# Patient Record
Sex: FEMALE | Race: WHITE | NOT HISPANIC OR LATINO | Employment: STUDENT | URBAN - METROPOLITAN AREA
[De-identification: names, ages, dates, MRNs, and addresses within clinical notes are randomized per-mention and may not be internally consistent; named-entity substitution may affect disease eponyms.]

---

## 2023-08-04 PROBLEM — F41.0 PANIC DISORDER: Status: ACTIVE | Noted: 2023-08-04

## 2023-08-04 PROBLEM — N94.6 DYSMENORRHEA IN ADOLESCENT: Status: ACTIVE | Noted: 2023-08-04

## 2023-08-04 RX ORDER — ESCITALOPRAM OXALATE 10 MG/1
1 TABLET ORAL DAILY
COMMUNITY
Start: 2022-10-31 | End: 2023-09-05 | Stop reason: SDUPTHER

## 2023-08-04 RX ORDER — NORETHINDRONE ACETATE AND ETHINYL ESTRADIOL 1MG-20(21)
KIT ORAL
COMMUNITY
Start: 2022-11-01 | End: 2023-09-05 | Stop reason: SDUPTHER

## 2023-08-04 RX ORDER — L.ACID,FERM,PLA,RHA/B.BIF,LONG 126 MG
TABLET, DELAYED AND EXTENDED RELEASE ORAL
COMMUNITY
Start: 2022-10-31

## 2023-08-04 RX ORDER — HYDROXYZINE HYDROCHLORIDE 10 MG/1
1 TABLET, FILM COATED ORAL 3 TIMES DAILY PRN
COMMUNITY
Start: 2022-10-31 | End: 2023-09-05 | Stop reason: SDUPTHER

## 2023-09-05 ENCOUNTER — OFFICE VISIT (OUTPATIENT)
Dept: PRIMARY CARE | Facility: CLINIC | Age: 18
End: 2023-09-05
Payer: OTHER GOVERNMENT

## 2023-09-05 VITALS
SYSTOLIC BLOOD PRESSURE: 114 MMHG | BODY MASS INDEX: 25.06 KG/M2 | HEART RATE: 68 BPM | OXYGEN SATURATION: 99 % | WEIGHT: 146 LBS | TEMPERATURE: 96.8 F | RESPIRATION RATE: 16 BRPM | DIASTOLIC BLOOD PRESSURE: 64 MMHG

## 2023-09-05 DIAGNOSIS — Z01.419 WELL WOMAN EXAM WITH ROUTINE GYNECOLOGICAL EXAM: ICD-10-CM

## 2023-09-05 DIAGNOSIS — F41.9 ANXIETY: ICD-10-CM

## 2023-09-05 DIAGNOSIS — F41.0 PANIC DISORDER: Primary | ICD-10-CM

## 2023-09-05 DIAGNOSIS — N94.6 DYSMENORRHEA IN ADOLESCENT: ICD-10-CM

## 2023-09-05 DIAGNOSIS — R21 RASH OF FACE: ICD-10-CM

## 2023-09-05 PROCEDURE — 1036F TOBACCO NON-USER: CPT

## 2023-09-05 PROCEDURE — 99213 OFFICE O/P EST LOW 20 MIN: CPT

## 2023-09-05 RX ORDER — ESCITALOPRAM OXALATE 10 MG/1
10 TABLET ORAL DAILY
Qty: 90 TABLET | Refills: 3 | Status: SHIPPED | OUTPATIENT
Start: 2023-09-05 | End: 2024-02-16 | Stop reason: ALTCHOICE

## 2023-09-05 RX ORDER — NORETHINDRONE ACETATE AND ETHINYL ESTRADIOL 1MG-20(21)
1 KIT ORAL DAILY
Qty: 28 TABLET | Refills: 3 | Status: SHIPPED | OUTPATIENT
Start: 2023-09-05 | End: 2023-10-12 | Stop reason: SDUPTHER

## 2023-09-05 RX ORDER — METRONIDAZOLE 10 MG/G
1 GEL TOPICAL DAILY
Qty: 60 G | Refills: 11 | Status: SHIPPED | OUTPATIENT
Start: 2023-09-05 | End: 2024-02-16 | Stop reason: ALTCHOICE

## 2023-09-05 RX ORDER — HYDROXYZINE HYDROCHLORIDE 10 MG/1
10 TABLET, FILM COATED ORAL 3 TIMES DAILY PRN
Qty: 90 TABLET | Refills: 3 | Status: SHIPPED | OUTPATIENT
Start: 2023-09-05

## 2023-09-05 RX ORDER — METRONIDAZOLE 10 MG/G
1 GEL TOPICAL DAILY
COMMUNITY
End: 2023-09-05 | Stop reason: SDUPTHER

## 2023-09-05 SDOH — ECONOMIC STABILITY: FOOD INSECURITY: WITHIN THE PAST 12 MONTHS, THE FOOD YOU BOUGHT JUST DIDN'T LAST AND YOU DIDN'T HAVE MONEY TO GET MORE.: NEVER TRUE

## 2023-09-05 SDOH — ECONOMIC STABILITY: FOOD INSECURITY: WITHIN THE PAST 12 MONTHS, YOU WORRIED THAT YOUR FOOD WOULD RUN OUT BEFORE YOU GOT MONEY TO BUY MORE.: NEVER TRUE

## 2023-09-05 ASSESSMENT — PATIENT HEALTH QUESTIONNAIRE - PHQ9
1. LITTLE INTEREST OR PLEASURE IN DOING THINGS: NOT AT ALL
2. FEELING DOWN, DEPRESSED OR HOPELESS: NOT AT ALL
SUM OF ALL RESPONSES TO PHQ9 QUESTIONS 1 & 2: 0

## 2023-09-05 ASSESSMENT — ENCOUNTER SYMPTOMS
EYES NEGATIVE: 1
PSYCHIATRIC NEGATIVE: 1
CONSTITUTIONAL NEGATIVE: 1
MUSCULOSKELETAL NEGATIVE: 1
ALLERGIC/IMMUNOLOGIC NEGATIVE: 1
GASTROINTESTINAL NEGATIVE: 1
NEUROLOGICAL NEGATIVE: 1
RESPIRATORY NEGATIVE: 1
ENDOCRINE NEGATIVE: 1
HEMATOLOGIC/LYMPHATIC NEGATIVE: 1
CARDIOVASCULAR NEGATIVE: 1

## 2023-09-05 ASSESSMENT — LIFESTYLE VARIABLES
HOW OFTEN DO YOU HAVE SIX OR MORE DRINKS ON ONE OCCASION: NEVER
SKIP TO QUESTIONS 9-10: 1
HOW MANY STANDARD DRINKS CONTAINING ALCOHOL DO YOU HAVE ON A TYPICAL DAY: PATIENT DOES NOT DRINK
AUDIT-C TOTAL SCORE: 0
HOW OFTEN DO YOU HAVE A DRINK CONTAINING ALCOHOL: NEVER

## 2023-09-05 ASSESSMENT — PAIN SCALES - GENERAL: PAINLEVEL: 0-NO PAIN

## 2023-09-05 NOTE — LETTER
September 5, 2023     Patient: Lindsay Rogers   YOB: 2005   Date of Visit: 9/5/2023       To Whom It May Concern:    Lindsay Rogers was seen in my clinic on 9/5/2023 at 3:00 pm. Please excuse Lindsay for her absence from school on this day to make the appointment.    If you have any questions or concerns, please don't hesitate to call.         Sincerely,         Edel Washington, APRN-CNS        CC: No Recipients

## 2023-09-05 NOTE — ASSESSMENT & PLAN NOTE
Pt reports a rash around her nose and chin, treated with metronidazole gel with success. Rash disappeared within 2 weeks with metronidazole gel.    Continue current therapy.

## 2023-09-05 NOTE — ASSESSMENT & PLAN NOTE
Pt stating that she is tolerating current therapy and that anxiety/panic attacks are controlled. Panic attacks are only occurring every couple months, uses hydroxyzine PRN to treat with success.     Continue hydroxyzine and escitalopram.

## 2023-09-05 NOTE — PATIENT INSTRUCTIONS
Thank you for coming to see me today.  If you have any questions or concerns following our visit, please contact the office.  Phone: [phone]    Follow up with me in 1 year.     1)  I am referring you to gynecology - please call [phone] to schedule an appointment or stop to 's office (in the lab) on your way out today.    2) Obtain labwork, [address] down the [address].

## 2023-09-05 NOTE — PROGRESS NOTES
Subjective   Patient ID: Lindsay Rogers is a 18 y.o. female who presents for medication refills and to establish with provider.    Pt has no complaints.     Diet: overall healthy balanced diet, eating out x2 per month. Occasional granola bar snacks. Coffee cold brew x4 times per week. Energy drink x3 times per week.   Exercise: running and lifting 1.5 hour x4 times per week.   Weight: stable   Water: 6-7 glasses per day.   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 Apartment. Parents in [place], were in [place] last year for [employer], contributing to her panic attacks.   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     Review of Systems   Constitutional: Negative.    HENT: Negative.     Eyes: Negative.    Respiratory: Negative.     Cardiovascular: Negative.    Gastrointestinal: Negative.    Endocrine: Negative.    Genitourinary: Negative.    Musculoskeletal: Negative.    Allergic/Immunologic: Negative.    Neurological: Negative.    Hematological: Negative.    Psychiatric/Behavioral: Negative.          Current Outpatient Medications   Medication Sig Dispense Refill   • L.acid,ferm,jose,rha-B.bif,long (Controlled Delivery Probiotic) 126 mg (2 billion cell) tablet,delayed and ext.release Take by mouth.     • escitalopram (Lexapro) 10 mg tablet Take 1 tablet (10 mg) by mouth once daily. 90 tablet 3   • hydrOXYzine HCL (Atarax) 10 mg tablet Take 1 tablet (10 mg) by mouth 3 times a day as needed for anxiety. 90 tablet 3   • metroNIDAZOLE (Metrogel) 1 % gel Apply 1 Application topically once daily. 60 g 11   • norethindrone-e.estradioL-iron (Blisovi Fe 1/20, 28,) 1 mg-20 mcg (21)/75 mg (7) tablet Take 1 tablet by mouth once daily. 28 tablet 3     No current facility-administered medications for this visit.     Past Surgical History:   Procedure Laterality Date   • OTHER SURGICAL HISTORY  01/28/2022    Hip labral tear  repair     No family history on file.   Social History     Tobacco Use   • Smoking status: Never   • Smokeless tobacco: Never   Vaping Use   • Vaping Use: Never used   Substance Use Topics   • Alcohol use: Never   • Drug use: Never        Objective     Visit Vitals  /64 (BP Location: Right arm, Patient Position: Sitting, BP Cuff Size: Adult)   Pulse 68   Temp 36 °C (96.8 °F) (Temporal)   Resp 16   Wt 66.2 kg (146 lb)   SpO2 99%   BMI 25.06 kg/m²   Smoking Status Never   BSA 1.73 m²        Physical Exam  Constitutional:       Appearance: Normal appearance. She is normal weight.   HENT:      Head: Normocephalic and atraumatic.      Mouth/Throat:      Mouth: Mucous membranes are moist.      Pharynx: Oropharynx is clear.   Eyes:      Extraocular Movements: Extraocular movements intact.   Cardiovascular:      Rate and Rhythm: Normal rate and regular rhythm.      Pulses: Normal pulses.      Heart sounds: Normal heart sounds.   Pulmonary:      Effort: Pulmonary effort is normal.      Breath sounds: Normal breath sounds.   Abdominal:      General: Abdomen is flat. Bowel sounds are normal.      Palpations: Abdomen is soft.   Musculoskeletal:      Cervical back: Neck supple.   Skin:     General: Skin is warm and dry.      Capillary Refill: Capillary refill takes less than 2 seconds.   Neurological:      General: No focal deficit present.      Mental Status: She is alert.   Psychiatric:         Mood and Affect: Mood normal.         Behavior: Behavior normal.         Assessment/Plan   Problem List Items Addressed This Visit       Dysmenorrhea in adolescent    Relevant Medications    norethindrone-e.estradioL-iron (Blisovi Fe 1/20, 28,) 1 mg-20 mcg (21)/75 mg (7) tablet    Panic disorder - Primary     Pt stating that she is tolerating current therapy and that anxiety/panic attacks are controlled. Panic attacks are only occurring every couple months, uses hydroxyzine PRN to treat with success.     Continue hydroxyzine and  escitalopram.          Rash of face     Pt reports a rash around her nose and chin, treated with metronidazole gel with success. Rash disappeared within 2 weeks with metronidazole gel.    Continue current therapy.          Relevant Medications    metroNIDAZOLE (Metrogel) 1 % gel     Other Visit Diagnoses       Anxiety        Relevant Medications    escitalopram (Lexapro) 10 mg tablet    hydrOXYzine HCL (Atarax) 10 mg tablet    Other Relevant Orders    Comprehensive metabolic panel    CBC    Well woman exam with routine gynecological exam        Relevant Orders    Referral to Gynecology            All pertinent lab work and results were reviewed with patient.     Follow up with me in 1 year or sooner as needed    Edel Washington, APRN-CNS

## 2023-09-06 ENCOUNTER — LAB (OUTPATIENT)
Dept: LAB | Facility: LAB | Age: 18
End: 2023-09-06
Payer: OTHER GOVERNMENT

## 2023-09-06 DIAGNOSIS — F41.9 ANXIETY: ICD-10-CM

## 2023-09-06 LAB
ALANINE AMINOTRANSFERASE (SGPT) (U/L) IN SER/PLAS: 20 U/L (ref 7–45)
ALBUMIN (G/DL) IN SER/PLAS: 4.4 G/DL (ref 3.4–5)
ALKALINE PHOSPHATASE (U/L) IN SER/PLAS: 44 U/L (ref 33–110)
ANION GAP IN SER/PLAS: 9 MMOL/L (ref 10–20)
ASPARTATE AMINOTRANSFERASE (SGOT) (U/L) IN SER/PLAS: 25 U/L (ref 9–39)
BILIRUBIN TOTAL (MG/DL) IN SER/PLAS: 0.5 MG/DL (ref 0–1.2)
CALCIUM (MG/DL) IN SER/PLAS: 9.4 MG/DL (ref 8.6–10.3)
CARBON DIOXIDE, TOTAL (MMOL/L) IN SER/PLAS: 27 MMOL/L (ref 21–32)
CHLORIDE (MMOL/L) IN SER/PLAS: 103 MMOL/L (ref 98–107)
CREATININE (MG/DL) IN SER/PLAS: 0.98 MG/DL (ref 0.5–1.05)
ERYTHROCYTE DISTRIBUTION WIDTH (RATIO) BY AUTOMATED COUNT: 12.4 % (ref 11.5–14.5)
ERYTHROCYTE MEAN CORPUSCULAR HEMOGLOBIN CONCENTRATION (G/DL) BY AUTOMATED: 33 G/DL (ref 32–36)
ERYTHROCYTE MEAN CORPUSCULAR VOLUME (FL) BY AUTOMATED COUNT: 85 FL (ref 80–100)
ERYTHROCYTES (10*6/UL) IN BLOOD BY AUTOMATED COUNT: 5.05 X10E12/L (ref 4–5.2)
GFR FEMALE: 85 ML/MIN/1.73M2
GLUCOSE (MG/DL) IN SER/PLAS: 81 MG/DL (ref 74–99)
HEMATOCRIT (%) IN BLOOD BY AUTOMATED COUNT: 42.7 % (ref 36–46)
HEMOGLOBIN (G/DL) IN BLOOD: 14.1 G/DL (ref 12–16)
LEUKOCYTES (10*3/UL) IN BLOOD BY AUTOMATED COUNT: 5.1 X10E9/L (ref 4.4–11.3)
PLATELETS (10*3/UL) IN BLOOD AUTOMATED COUNT: 311 X10E9/L (ref 150–450)
POTASSIUM (MMOL/L) IN SER/PLAS: 3.9 MMOL/L (ref 3.5–5.3)
PROTEIN TOTAL: 6.7 G/DL (ref 6.4–8.2)
SODIUM (MMOL/L) IN SER/PLAS: 135 MMOL/L (ref 136–145)
UREA NITROGEN (MG/DL) IN SER/PLAS: 12 MG/DL (ref 6–23)

## 2023-09-06 PROCEDURE — 85027 COMPLETE CBC AUTOMATED: CPT

## 2023-09-06 PROCEDURE — 80053 COMPREHEN METABOLIC PANEL: CPT

## 2023-09-06 PROCEDURE — 36415 COLL VENOUS BLD VENIPUNCTURE: CPT

## 2023-09-07 ENCOUNTER — TELEPHONE (OUTPATIENT)
Dept: PRIMARY CARE | Facility: CLINIC | Age: 18
End: 2023-09-07
Payer: OTHER GOVERNMENT

## 2023-09-07 NOTE — TELEPHONE ENCOUNTER
Labs look good, no concerns. Anion gap may be affected by slightly low sodium. Anion gap is a calculated value that becomes more important in patients in intensive care setting. Sodium slightly low is not a problem or concern.

## 2023-10-12 ENCOUNTER — OFFICE VISIT (OUTPATIENT)
Dept: OBSTETRICS AND GYNECOLOGY | Facility: CLINIC | Age: 18
End: 2023-10-12
Payer: OTHER GOVERNMENT

## 2023-10-12 VITALS
HEIGHT: 63 IN | BODY MASS INDEX: 25.87 KG/M2 | SYSTOLIC BLOOD PRESSURE: 100 MMHG | DIASTOLIC BLOOD PRESSURE: 72 MMHG | WEIGHT: 146 LBS

## 2023-10-12 DIAGNOSIS — Z01.419 WOMEN'S ANNUAL ROUTINE GYNECOLOGICAL EXAMINATION: Primary | ICD-10-CM

## 2023-10-12 DIAGNOSIS — Z30.41 ENCOUNTER FOR SURVEILLANCE OF CONTRACEPTIVE PILLS: ICD-10-CM

## 2023-10-12 DIAGNOSIS — N94.6 DYSMENORRHEA IN ADOLESCENT: ICD-10-CM

## 2023-10-12 PROCEDURE — 1036F TOBACCO NON-USER: CPT | Performed by: NURSE PRACTITIONER

## 2023-10-12 PROCEDURE — 99385 PREV VISIT NEW AGE 18-39: CPT | Performed by: NURSE PRACTITIONER

## 2023-10-12 RX ORDER — NORETHINDRONE ACETATE AND ETHINYL ESTRADIOL 1MG-20(21)
1 KIT ORAL DAILY
Qty: 28 TABLET | Refills: 12 | Status: SHIPPED | OUTPATIENT
Start: 2023-10-12 | End: 2024-10-11

## 2023-10-12 ASSESSMENT — ENCOUNTER SYMPTOMS
PSYCHIATRIC NEGATIVE: 1
EYES NEGATIVE: 1
CONSTITUTIONAL NEGATIVE: 1
CARDIOVASCULAR NEGATIVE: 1
GASTROINTESTINAL NEGATIVE: 1
ENDOCRINE NEGATIVE: 1
NEUROLOGICAL NEGATIVE: 1
MUSCULOSKELETAL NEGATIVE: 1
RESPIRATORY NEGATIVE: 1

## 2023-10-12 NOTE — PROGRESS NOTES
Subjective   Patient ID: Lindsay Rogers is a 18 y.o. female who presents for New Patient Visit (NPV, states she was referred by her PCP for PAP and discuss her birth control pills. /).  18 year old here for annual exam without complaints.  She is doing well with her ocp and desires to continue.  She started using her birth controlabout 5 years ago to help with dysmenorrhea.  This has helped her a lot.  Her mom has a history of endometriosis.  She denies any pain or heavy bleeding with her ocp.  She denies any breakthrough bleeding and desires to continue.  She is not due for a pap due to age.  She denies any discharge, need for std testing and urinary changes.         Review of Systems   Constitutional: Negative.    HENT: Negative.     Eyes: Negative.    Respiratory: Negative.     Cardiovascular: Negative.    Gastrointestinal: Negative.    Endocrine: Negative.    Genitourinary: Negative.    Musculoskeletal: Negative.    Skin: Negative.    Neurological: Negative.    Psychiatric/Behavioral: Negative.         Objective   Physical Exam  Vitals reviewed.   Constitutional:       Appearance: Normal appearance. She is well-developed.   Pulmonary:      Effort: Pulmonary effort is normal. No respiratory distress.   Chest:   Breasts:     Breasts are symmetrical.      Right: Normal. No swelling, bleeding, inverted nipple, mass, nipple discharge, skin change or tenderness.      Left: Normal. No swelling, bleeding, inverted nipple, mass, nipple discharge, skin change or tenderness.   Abdominal:      Palpations: Abdomen is soft.   Genitourinary:     General: Normal vulva.      Exam position: Lithotomy position.      Pubic Area: No rash.       Labia:         Right: No rash, tenderness, lesion or injury.         Left: No rash, tenderness, lesion or injury.       Urethra: No prolapse, urethral pain, urethral swelling or urethral lesion.      Vagina: Normal.      Cervix: Normal.      Uterus: Normal.       Adnexa: Right adnexa normal  and left adnexa normal.      Rectum: Normal.   Musculoskeletal:         General: Normal range of motion.   Lymphadenopathy:      Upper Body:      Right upper body: No supraclavicular, axillary or pectoral adenopathy.      Left upper body: No supraclavicular, axillary or pectoral adenopathy.   Skin:     General: Skin is warm and dry.   Neurological:      General: No focal deficit present.      Mental Status: She is alert and oriented to person, place, and time. Mental status is at baseline.   Psychiatric:         Attention and Perception: Attention and perception normal.         Mood and Affect: Mood and affect normal.         Speech: Speech normal.         Behavior: Behavior normal. Behavior is cooperative.         Thought Content: Thought content normal.         Judgment: Judgment normal.         Assessment/Plan   Problem List Items Addressed This Visit             ICD-10-CM    Dysmenorrhea in adolescent N94.6    Relevant Medications    norethindrone-e.estradioL-iron (Blisovi Fe 1/20, 28,) 1 mg-20 mcg (21)/75 mg (7) tablet     Other Visit Diagnoses         Codes    Women's annual routine gynecological examination    -  Primary Z01.419    Encounter for surveillance of contraceptive pills     Z30.41        ASSESSMENT:   Pap due age 19  Declined std testing  Oral contraceptives were discussed.  The risks and benefits were presented to the patient including the risk for VTE's and an increased risk with smoking. Patient stated understanding and desires use of OCP.  Additional use of condoms encouraged to patient to prevent STI's.  Blisovi fe ordered with refills x 1 year  Follow up 1 year or as needed

## 2023-11-13 ENCOUNTER — OFFICE VISIT (OUTPATIENT)
Dept: PRIMARY CARE | Facility: CLINIC | Age: 18
End: 2023-11-13
Payer: OTHER GOVERNMENT

## 2023-11-13 VITALS
RESPIRATION RATE: 16 BRPM | HEART RATE: 68 BPM | TEMPERATURE: 96.6 F | BODY MASS INDEX: 25.69 KG/M2 | SYSTOLIC BLOOD PRESSURE: 114 MMHG | OXYGEN SATURATION: 99 % | DIASTOLIC BLOOD PRESSURE: 68 MMHG | WEIGHT: 145 LBS

## 2023-11-13 DIAGNOSIS — F41.8 ANXIETY WITH DEPRESSION: Primary | ICD-10-CM

## 2023-11-13 PROBLEM — Q82.5 CONGENITAL PIGMENTED MELANOCYTIC NEVUS: Status: ACTIVE | Noted: 2023-11-13

## 2023-11-13 PROBLEM — R53.83 OTHER FATIGUE: Status: ACTIVE | Noted: 2023-11-13

## 2023-11-13 PROBLEM — D22.9 CONGENITAL PIGMENTED MELANOCYTIC NEVUS: Status: ACTIVE | Noted: 2023-11-13

## 2023-11-13 PROBLEM — M25.561 PAIN IN RIGHT KNEE: Status: ACTIVE | Noted: 2018-06-25

## 2023-11-13 PROBLEM — H52.13 MYOPIA, BILATERAL: Status: ACTIVE | Noted: 2023-11-13

## 2023-11-13 PROBLEM — H18.512 ENDOTHELIAL CORNEAL DYSTROPHY, LEFT EYE: Status: ACTIVE | Noted: 2023-11-13

## 2023-11-13 PROBLEM — M22.2X2 PATELLOFEMORAL PAIN SYNDROME OF LEFT KNEE: Status: ACTIVE | Noted: 2023-11-13

## 2023-11-13 PROCEDURE — 99213 OFFICE O/P EST LOW 20 MIN: CPT

## 2023-11-13 PROCEDURE — 1036F TOBACCO NON-USER: CPT

## 2023-11-13 RX ORDER — VENLAFAXINE HYDROCHLORIDE 75 MG/1
75 CAPSULE, EXTENDED RELEASE ORAL DAILY
Qty: 90 CAPSULE | Refills: 3 | Status: SHIPPED | OUTPATIENT
Start: 2023-12-04 | End: 2024-01-15 | Stop reason: ALTCHOICE

## 2023-11-13 RX ORDER — VENLAFAXINE HYDROCHLORIDE 37.5 MG/1
CAPSULE, EXTENDED RELEASE ORAL
Qty: 46 CAPSULE | Refills: 0 | Status: SHIPPED | OUTPATIENT
Start: 2023-11-13 | End: 2024-01-15 | Stop reason: SDUPTHER

## 2023-11-13 SDOH — ECONOMIC STABILITY: FOOD INSECURITY: WITHIN THE PAST 12 MONTHS, YOU WORRIED THAT YOUR FOOD WOULD RUN OUT BEFORE YOU GOT MONEY TO BUY MORE.: NEVER TRUE

## 2023-11-13 SDOH — ECONOMIC STABILITY: FOOD INSECURITY: WITHIN THE PAST 12 MONTHS, THE FOOD YOU BOUGHT JUST DIDN'T LAST AND YOU DIDN'T HAVE MONEY TO GET MORE.: NEVER TRUE

## 2023-11-13 ASSESSMENT — ENCOUNTER SYMPTOMS
RESPIRATORY NEGATIVE: 1
MUSCULOSKELETAL NEGATIVE: 1
EYES NEGATIVE: 1
CARDIOVASCULAR NEGATIVE: 1
NEUROLOGICAL NEGATIVE: 1
ENDOCRINE NEGATIVE: 1
PSYCHIATRIC NEGATIVE: 1
GASTROINTESTINAL NEGATIVE: 1
HEMATOLOGIC/LYMPHATIC NEGATIVE: 1
CONSTITUTIONAL NEGATIVE: 1

## 2023-11-13 ASSESSMENT — ANXIETY QUESTIONNAIRES
IF YOU CHECKED OFF ANY PROBLEMS ON THIS QUESTIONNAIRE, HOW DIFFICULT HAVE THESE PROBLEMS MADE IT FOR YOU TO DO YOUR WORK, TAKE CARE OF THINGS AT HOME, OR GET ALONG WITH OTHER PEOPLE: SOMEWHAT DIFFICULT
6. BECOMING EASILY ANNOYED OR IRRITABLE: SEVERAL DAYS
1. FEELING NERVOUS, ANXIOUS, OR ON EDGE: NEARLY EVERY DAY
2. NOT BEING ABLE TO STOP OR CONTROL WORRYING: SEVERAL DAYS
4. TROUBLE RELAXING: NEARLY EVERY DAY
GAD7 TOTAL SCORE: 9
3. WORRYING TOO MUCH ABOUT DIFFERENT THINGS: SEVERAL DAYS
5. BEING SO RESTLESS THAT IT IS HARD TO SIT STILL: NOT AT ALL
7. FEELING AFRAID AS IF SOMETHING AWFUL MIGHT HAPPEN: NOT AT ALL

## 2023-11-13 ASSESSMENT — PATIENT HEALTH QUESTIONNAIRE - PHQ9
2. FEELING DOWN, DEPRESSED OR HOPELESS: SEVERAL DAYS
SUM OF ALL RESPONSES TO PHQ9 QUESTIONS 1 & 2: 2
10. IF YOU CHECKED OFF ANY PROBLEMS, HOW DIFFICULT HAVE THESE PROBLEMS MADE IT FOR YOU TO DO YOUR WORK, TAKE CARE OF THINGS AT HOME, OR GET ALONG WITH OTHER PEOPLE: SOMEWHAT DIFFICULT
1. LITTLE INTEREST OR PLEASURE IN DOING THINGS: SEVERAL DAYS

## 2023-11-13 ASSESSMENT — PAIN SCALES - GENERAL: PAINLEVEL: 0-NO PAIN

## 2023-11-13 ASSESSMENT — LIFESTYLE VARIABLES
SKIP TO QUESTIONS 9-10: 1
HOW OFTEN DO YOU HAVE SIX OR MORE DRINKS ON ONE OCCASION: NEVER
HOW OFTEN DO YOU HAVE A DRINK CONTAINING ALCOHOL: NEVER
HOW MANY STANDARD DRINKS CONTAINING ALCOHOL DO YOU HAVE ON A TYPICAL DAY: PATIENT DOES NOT DRINK
AUDIT-C TOTAL SCORE: 0

## 2023-11-13 NOTE — PATIENT INSTRUCTIONS
Thank you for coming to see me today.  If you have any questions or concerns following our visit, please contact the office.  Phone: [phone]    Follow up with me in 3 months    1)  START venlafaxine 37.5mg daily for 2 weeks then increase to 75mg (2 capsule) daily thereafter. After 30 days next script will be stronger pill, please take 1 capsule daily

## 2023-11-13 NOTE — PROGRESS NOTES
Subjective   Patient ID: Lindsya Rogers is a 18 y.o. female who presents for follow up of anxiety.    Started medications for anxiety about a year ago, was getting very anxious and throwing up. Medication worked well for a while but recently with higher stress at school and now starting to have recurrence of nausea in setting of high anxiety. Concerned about some worsening depression due to having relapse of symptoms.     Diet: overall healthy balanced diet, eating out x2 per month. Occasional granola bar snacks. Coffee cold brew x4 times per week. Energy drink x3 times per week.   Exercise: running and lifting 1.5 hour x4 times per week.   Weight: stable   Water: 6-7 glasses per day.   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 Apartment. Parents in [place], were in [place] last year for [employer], contributing to her panic attacks.   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     Review of Systems   Constitutional: Negative.    HENT: Negative.     Eyes: Negative.    Respiratory: Negative.     Cardiovascular: Negative.    Gastrointestinal: Negative.    Endocrine: Negative.    Genitourinary: Negative.    Musculoskeletal: Negative.    Skin: Negative.    Neurological: Negative.    Hematological: Negative.    Psychiatric/Behavioral: Negative.          Current Outpatient Medications   Medication Sig Dispense Refill    escitalopram (Lexapro) 10 mg tablet Take 1 tablet (10 mg) by mouth once daily. 90 tablet 3    hydrOXYzine HCL (Atarax) 10 mg tablet Take 1 tablet (10 mg) by mouth 3 times a day as needed for anxiety. 90 tablet 3    L.acid,ferm,jose,rha-B.bif,long (Controlled Delivery Probiotic) 126 mg (2 billion cell) tablet,delayed and ext.release Take by mouth.      metroNIDAZOLE (Metrogel) 1 % gel Apply 1 Application topically once daily. 60 g 11    norethindrone-e.estradioL-iron (Blisovi Fe 1/20, 28,) 1 mg-20 mcg  (21)/75 mg (7) tablet Take 1 tablet by mouth once daily. 28 tablet 12    venlafaxine XR (Effexor-XR) 37.5 mg 24 hr capsule Take 1 capsule (37.5 mg) by mouth once daily for 14 days, THEN 2 capsules (75 mg) once daily for 16 days. Do not crush or chew.. 46 capsule 0    [START ON 12/4/2023] venlafaxine XR (Effexor-XR) 75 mg 24 hr capsule Take 1 capsule (75 mg) by mouth once daily. Take with food. Do not start before December 4, 2023. 90 capsule 3     No current facility-administered medications for this visit.     Past Surgical History:   Procedure Laterality Date    OTHER SURGICAL HISTORY  01/28/2022    Hip labral tear repair     No family history on file.   Social History     Tobacco Use    Smoking status: Never    Smokeless tobacco: Never   Vaping Use    Vaping Use: Never used   Substance Use Topics    Alcohol use: Never    Drug use: Never        Objective     Visit Vitals  /68 (BP Location: Right arm, Patient Position: Sitting, BP Cuff Size: Adult)   Pulse 68   Temp 35.9 °C (96.6 °F) (Temporal)   Resp 16   Wt 65.8 kg (145 lb)   SpO2 99%   BMI 25.69 kg/m²   OB Status Having periods   Smoking Status Never   BSA 1.71 m²        Physical Exam  Constitutional:       Appearance: Normal appearance.   HENT:      Head: Normocephalic and atraumatic.   Eyes:      Extraocular Movements: Extraocular movements intact.      Pupils: Pupils are equal, round, and reactive to light.   Cardiovascular:      Rate and Rhythm: Normal rate and regular rhythm.   Pulmonary:      Effort: Pulmonary effort is normal.      Breath sounds: Normal breath sounds.   Abdominal:      General: Abdomen is flat. Bowel sounds are normal.      Palpations: Abdomen is soft.   Musculoskeletal:         General: Normal range of motion.   Skin:     General: Skin is warm and dry.      Capillary Refill: Capillary refill takes less than 2 seconds.   Neurological:      General: No focal deficit present.      Mental Status: She is alert and oriented to person,  place, and time.   Psychiatric:         Mood and Affect: Mood normal.         Behavior: Behavior normal.           Assessment/Plan   Problem List Items Addressed This Visit       Anxiety with depression - Primary     GAD7 score of 9 today    Add venlfaxine 35.7mg daily x 2 weeks then increase to 75mg daily thereafter  Continue escitalopram 10mg daily and hydroxyzine 10mg TID PRN         Relevant Medications    venlafaxine XR (Effexor-XR) 37.5 mg 24 hr capsule    venlafaxine XR (Effexor-XR) 75 mg 24 hr capsule (Start on 12/4/2023)       All pertinent lab work and results were reviewed with patient.     Follow up with me in 3 months    Edel Washington, FENG-CNS

## 2023-11-13 NOTE — ASSESSMENT & PLAN NOTE
GAD7 score of 9 today    Add venlfaxine 35.7mg daily x 2 weeks then increase to 75mg daily thereafter  Continue escitalopram 10mg daily and hydroxyzine 10mg TID PRN

## 2024-01-15 ENCOUNTER — TELEPHONE (OUTPATIENT)
Dept: PRIMARY CARE | Facility: CLINIC | Age: 19
End: 2024-01-15
Payer: OTHER GOVERNMENT

## 2024-01-15 DIAGNOSIS — F41.8 ANXIETY WITH DEPRESSION: ICD-10-CM

## 2024-01-15 RX ORDER — VENLAFAXINE HYDROCHLORIDE 37.5 MG/1
37.5 CAPSULE, EXTENDED RELEASE ORAL DAILY
Qty: 90 CAPSULE | Refills: 2 | Status: SHIPPED | OUTPATIENT
Start: 2024-01-15 | End: 2025-01-14

## 2024-01-15 NOTE — TELEPHONE ENCOUNTER
PATIENT CALLED IN STATING SHE DOES NOT WANT TO GO UP ON HER DOES OF THE VENLAFAXINE. SHE WANTS TO STAY AT THE LOWER DOSE 37.5    PHARMACY: [facility]

## 2024-02-16 ENCOUNTER — OFFICE VISIT (OUTPATIENT)
Dept: PRIMARY CARE | Facility: CLINIC | Age: 19
End: 2024-02-16
Payer: OTHER GOVERNMENT

## 2024-02-16 ENCOUNTER — PATIENT MESSAGE (OUTPATIENT)
Dept: OBSTETRICS AND GYNECOLOGY | Facility: CLINIC | Age: 19
End: 2024-02-16

## 2024-02-16 VITALS
HEART RATE: 87 BPM | HEIGHT: 63 IN | BODY MASS INDEX: 25.73 KG/M2 | DIASTOLIC BLOOD PRESSURE: 73 MMHG | RESPIRATION RATE: 20 BRPM | SYSTOLIC BLOOD PRESSURE: 110 MMHG | WEIGHT: 145.2 LBS | TEMPERATURE: 97.1 F | OXYGEN SATURATION: 97 %

## 2024-02-16 DIAGNOSIS — Z13.0 SCREENING FOR DEFICIENCY ANEMIA: Primary | ICD-10-CM

## 2024-02-16 DIAGNOSIS — R10.2 PELVIC PAIN: ICD-10-CM

## 2024-02-16 DIAGNOSIS — Z13.89 SCREENING FOR NEPHROPATHY: ICD-10-CM

## 2024-02-16 DIAGNOSIS — N94.6 DYSMENORRHEA IN ADOLESCENT: ICD-10-CM

## 2024-02-16 DIAGNOSIS — Z13.220 SCREENING FOR HYPERLIPIDEMIA: ICD-10-CM

## 2024-02-16 DIAGNOSIS — Z13.29 SCREENING FOR THYROID DISORDER: ICD-10-CM

## 2024-02-16 DIAGNOSIS — F41.8 ANXIETY WITH DEPRESSION: ICD-10-CM

## 2024-02-16 PROCEDURE — 99212 OFFICE O/P EST SF 10 MIN: CPT

## 2024-02-16 PROCEDURE — 1036F TOBACCO NON-USER: CPT

## 2024-02-16 RX ORDER — NORETHINDRONE ACETATE AND ETHINYL ESTRADIOL 1MG-20(21)
1 KIT ORAL DAILY
Qty: 28 TABLET | Refills: 12 | Status: CANCELLED | OUTPATIENT
Start: 2024-02-16 | End: 2025-02-15

## 2024-02-16 SDOH — ECONOMIC STABILITY: FOOD INSECURITY: WITHIN THE PAST 12 MONTHS, THE FOOD YOU BOUGHT JUST DIDN'T LAST AND YOU DIDN'T HAVE MONEY TO GET MORE.: NEVER TRUE

## 2024-02-16 SDOH — ECONOMIC STABILITY: FOOD INSECURITY: WITHIN THE PAST 12 MONTHS, YOU WORRIED THAT YOUR FOOD WOULD RUN OUT BEFORE YOU GOT MONEY TO BUY MORE.: NEVER TRUE

## 2024-02-16 ASSESSMENT — PATIENT HEALTH QUESTIONNAIRE - PHQ9
SUM OF ALL RESPONSES TO PHQ9 QUESTIONS 1 & 2: 0
1. LITTLE INTEREST OR PLEASURE IN DOING THINGS: NOT AT ALL
2. FEELING DOWN, DEPRESSED OR HOPELESS: NOT AT ALL

## 2024-02-16 ASSESSMENT — ANXIETY QUESTIONNAIRES
7. FEELING AFRAID AS IF SOMETHING AWFUL MIGHT HAPPEN: NOT AT ALL
GAD7 TOTAL SCORE: 3
IF YOU CHECKED OFF ANY PROBLEMS ON THIS QUESTIONNAIRE, HOW DIFFICULT HAVE THESE PROBLEMS MADE IT FOR YOU TO DO YOUR WORK, TAKE CARE OF THINGS AT HOME, OR GET ALONG WITH OTHER PEOPLE: SOMEWHAT DIFFICULT
2. NOT BEING ABLE TO STOP OR CONTROL WORRYING: NOT AT ALL
3. WORRYING TOO MUCH ABOUT DIFFERENT THINGS: SEVERAL DAYS
1. FEELING NERVOUS, ANXIOUS, OR ON EDGE: SEVERAL DAYS
5. BEING SO RESTLESS THAT IT IS HARD TO SIT STILL: NOT AT ALL
4. TROUBLE RELAXING: SEVERAL DAYS
6. BECOMING EASILY ANNOYED OR IRRITABLE: NOT AT ALL

## 2024-02-16 ASSESSMENT — LIFESTYLE VARIABLES
SKIP TO QUESTIONS 9-10: 1
HOW OFTEN DO YOU HAVE SIX OR MORE DRINKS ON ONE OCCASION: NEVER
HOW MANY STANDARD DRINKS CONTAINING ALCOHOL DO YOU HAVE ON A TYPICAL DAY: PATIENT DOES NOT DRINK
HOW OFTEN DO YOU HAVE A DRINK CONTAINING ALCOHOL: NEVER
AUDIT-C TOTAL SCORE: 0

## 2024-02-16 ASSESSMENT — ENCOUNTER SYMPTOMS
OCCASIONAL FEELINGS OF UNSTEADINESS: 0
ENDOCRINE NEGATIVE: 1
RESPIRATORY NEGATIVE: 1
HEMATOLOGIC/LYMPHATIC NEGATIVE: 1
GASTROINTESTINAL NEGATIVE: 1
EYES NEGATIVE: 1
PSYCHIATRIC NEGATIVE: 1
CONSTITUTIONAL NEGATIVE: 1
LOSS OF SENSATION IN FEET: 0
NEUROLOGICAL NEGATIVE: 1
DEPRESSION: 0
CARDIOVASCULAR NEGATIVE: 1
MUSCULOSKELETAL NEGATIVE: 1

## 2024-02-16 ASSESSMENT — PAIN SCALES - GENERAL: PAINLEVEL: 0-NO PAIN

## 2024-02-16 NOTE — PROGRESS NOTES
Subjective   Patient ID: Lindsay Rogers is a 18 y.o. female who presents for follow up of anxiety.    Added venlafaxine in the interim; started having night sweats and nausea on 75mg daily venlafaxine, decreased back to 37.5mg daily and symptoms resolved and she's feeling better.     Concerns about her mother having endometriosis. Has been on birth control for 4-5 years, having clotting with period and concerns about pain.     Diet: overall healthy balanced diet, eating out x2 per month. Occasional granola bar snacks. Coffee cold brew x4 times per week. Energy drink x3 times per week.   Exercise: running and lifting 1.5 hour x4 times per week.   Weight: stable   Water: 6-7 glasses per day.   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 Apartment. Parents in [place], were in [place] last year for [employer], contributing to her panic attacks.   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     Review of Systems   Constitutional: Negative.    HENT: Negative.     Eyes: Negative.    Respiratory: Negative.     Cardiovascular: Negative.    Gastrointestinal: Negative.    Endocrine: Negative.    Genitourinary: Negative.    Musculoskeletal: Negative.    Skin: Negative.    Neurological: Negative.    Hematological: Negative.    Psychiatric/Behavioral: Negative.          Current Outpatient Medications   Medication Sig Dispense Refill    hydrOXYzine HCL (Atarax) 10 mg tablet Take 1 tablet (10 mg) by mouth 3 times a day as needed for anxiety. 90 tablet 3    L.acid,ferm,jose,rha-B.bif,long (Controlled Delivery Probiotic) 126 mg (2 billion cell) tablet,delayed and ext.release Take by mouth.      norethindrone-e.estradioL-iron (Blisovi Fe 1/20, 28,) 1 mg-20 mcg (21)/75 mg (7) tablet Take 1 tablet by mouth once daily. 28 tablet 12    venlafaxine XR (Effexor-XR) 37.5 mg 24 hr capsule Take 1 capsule (37.5 mg) by mouth once daily. Do not  "crush or chew. 90 capsule 2     No current facility-administered medications for this visit.     Past Surgical History:   Procedure Laterality Date    OTHER SURGICAL HISTORY  01/28/2022    Hip labral tear repair     Family History   Problem Relation Name Age of Onset    Endometriosis Mother        Social History     Tobacco Use    Smoking status: Never    Smokeless tobacco: Never   Vaping Use    Vaping Use: Never used   Substance Use Topics    Alcohol use: Never    Drug use: Never        Objective     Visit Vitals  /73 (BP Location: Left arm, Patient Position: Sitting, BP Cuff Size: Adult)   Pulse 87   Temp 36.2 °C (97.1 °F)   Resp 20   Ht 1.6 m (5' 3\")   Wt 65.9 kg (145 lb 3.2 oz)   SpO2 97%   BMI 25.72 kg/m²   OB Status Having periods   Smoking Status Never   BSA 1.71 m²        Physical Exam  Constitutional:       Appearance: Normal appearance.   HENT:      Head: Normocephalic and atraumatic.   Eyes:      Extraocular Movements: Extraocular movements intact.      Pupils: Pupils are equal, round, and reactive to light.   Musculoskeletal:         General: Normal range of motion.   Skin:     General: Skin is warm and dry.      Capillary Refill: Capillary refill takes less than 2 seconds.   Neurological:      General: No focal deficit present.      Mental Status: She is alert and oriented to person, place, and time.   Psychiatric:         Mood and Affect: Mood normal.         Behavior: Behavior normal.           Assessment/Plan   Problem List Items Addressed This Visit       Dysmenorrhea in adolescent    Anxiety with depression     GAD7 score of 3, PHQ2 score of 0  No longer on escitalopram, has only needed hydroxyzine once since last visit    Doing well on venlafaxine 37.5mg daily, continue          Other Visit Diagnoses       Screening for deficiency anemia    -  Primary    Relevant Orders    CBC    Screening for nephropathy        Relevant Orders    Comprehensive Metabolic Panel    Screening for hyperlipidemia "        Relevant Orders    Lipid Panel    Screening for thyroid disorder        Relevant Orders    TSH with reflex to Free T4 if abnormal    Vitamin D 25-Hydroxy,Total (for eval of Vitamin D levels)            All pertinent lab work and results were reviewed with patient.     Follow up me in September for FENG Amador-CNS

## 2024-02-16 NOTE — ASSESSMENT & PLAN NOTE
GAD7 score of 3, PHQ2 score of 0  No longer on escitalopram, has only needed hydroxyzine once since last visit    Doing well on venlafaxine 37.5mg daily, continue

## 2024-02-16 NOTE — PATIENT INSTRUCTIONS
Thank you for coming to see me today.  If you have any questions or concerns following our visit, please contact the office.  Phone: [phone]    Follow up with me in September for physical    1)  Get fasting labwork a few days prior to next visit.  The lab is down the black from our office.

## 2024-02-29 ENCOUNTER — HOSPITAL ENCOUNTER (OUTPATIENT)
Dept: RADIOLOGY | Facility: CLINIC | Age: 19
Discharge: HOME | End: 2024-02-29
Payer: OTHER GOVERNMENT

## 2024-02-29 DIAGNOSIS — R10.2 PELVIC PAIN: ICD-10-CM

## 2024-02-29 PROCEDURE — 76856 US EXAM PELVIC COMPLETE: CPT | Performed by: STUDENT IN AN ORGANIZED HEALTH CARE EDUCATION/TRAINING PROGRAM

## 2024-02-29 PROCEDURE — 76830 TRANSVAGINAL US NON-OB: CPT | Performed by: STUDENT IN AN ORGANIZED HEALTH CARE EDUCATION/TRAINING PROGRAM

## 2024-02-29 PROCEDURE — 76856 US EXAM PELVIC COMPLETE: CPT

## 2024-06-26 ENCOUNTER — OFFICE VISIT (OUTPATIENT)
Dept: PRIMARY CARE | Facility: CLINIC | Age: 19
End: 2024-06-26
Payer: OTHER GOVERNMENT

## 2024-06-26 VITALS
WEIGHT: 150 LBS | BODY MASS INDEX: 26.58 KG/M2 | DIASTOLIC BLOOD PRESSURE: 71 MMHG | HEIGHT: 63 IN | OXYGEN SATURATION: 99 % | TEMPERATURE: 97 F | SYSTOLIC BLOOD PRESSURE: 116 MMHG | RESPIRATION RATE: 14 BRPM | HEART RATE: 88 BPM

## 2024-06-26 DIAGNOSIS — L70.0 ACNE VULGARIS: Primary | ICD-10-CM

## 2024-06-26 PROCEDURE — 1036F TOBACCO NON-USER: CPT

## 2024-06-26 PROCEDURE — 99212 OFFICE O/P EST SF 10 MIN: CPT

## 2024-06-26 RX ORDER — TRETINOIN 0.25 MG/G
GEL TOPICAL NIGHTLY
Qty: 15 G | Refills: 2 | Status: SHIPPED | OUTPATIENT
Start: 2024-06-26 | End: 2024-10-24

## 2024-06-26 SDOH — ECONOMIC STABILITY: FOOD INSECURITY: WITHIN THE PAST 12 MONTHS, YOU WORRIED THAT YOUR FOOD WOULD RUN OUT BEFORE YOU GOT MONEY TO BUY MORE.: NEVER TRUE

## 2024-06-26 SDOH — ECONOMIC STABILITY: FOOD INSECURITY: WITHIN THE PAST 12 MONTHS, THE FOOD YOU BOUGHT JUST DIDN'T LAST AND YOU DIDN'T HAVE MONEY TO GET MORE.: NEVER TRUE

## 2024-06-26 ASSESSMENT — ANXIETY QUESTIONNAIRES
5. BEING SO RESTLESS THAT IT IS HARD TO SIT STILL: NOT AT ALL
4. TROUBLE RELAXING: NOT AT ALL
1. FEELING NERVOUS, ANXIOUS, OR ON EDGE: NOT AT ALL
7. FEELING AFRAID AS IF SOMETHING AWFUL MIGHT HAPPEN: NOT AT ALL
2. NOT BEING ABLE TO STOP OR CONTROL WORRYING: NOT AT ALL
6. BECOMING EASILY ANNOYED OR IRRITABLE: NOT AT ALL
GAD7 TOTAL SCORE: 0
3. WORRYING TOO MUCH ABOUT DIFFERENT THINGS: NOT AT ALL
IF YOU CHECKED OFF ANY PROBLEMS ON THIS QUESTIONNAIRE, HOW DIFFICULT HAVE THESE PROBLEMS MADE IT FOR YOU TO DO YOUR WORK, TAKE CARE OF THINGS AT HOME, OR GET ALONG WITH OTHER PEOPLE: NOT DIFFICULT AT ALL

## 2024-06-26 ASSESSMENT — ENCOUNTER SYMPTOMS
EYES NEGATIVE: 1
ENDOCRINE NEGATIVE: 1
CONSTITUTIONAL NEGATIVE: 1
GASTROINTESTINAL NEGATIVE: 1
RESPIRATORY NEGATIVE: 1
MUSCULOSKELETAL NEGATIVE: 1
CARDIOVASCULAR NEGATIVE: 1
ROS SKIN COMMENTS: ACNE

## 2024-06-26 ASSESSMENT — PATIENT HEALTH QUESTIONNAIRE - PHQ9
2. FEELING DOWN, DEPRESSED OR HOPELESS: NOT AT ALL
1. LITTLE INTEREST OR PLEASURE IN DOING THINGS: NOT AT ALL
SUM OF ALL RESPONSES TO PHQ9 QUESTIONS 1 & 2: 0

## 2024-06-26 ASSESSMENT — LIFESTYLE VARIABLES
HOW OFTEN DO YOU HAVE A DRINK CONTAINING ALCOHOL: NEVER
AUDIT-C TOTAL SCORE: 0
SKIP TO QUESTIONS 9-10: 1
HOW OFTEN DO YOU HAVE SIX OR MORE DRINKS ON ONE OCCASION: NEVER
HOW MANY STANDARD DRINKS CONTAINING ALCOHOL DO YOU HAVE ON A TYPICAL DAY: PATIENT DOES NOT DRINK

## 2024-06-26 ASSESSMENT — PAIN SCALES - GENERAL: PAINLEVEL: 0-NO PAIN

## 2024-06-26 NOTE — PROGRESS NOTES
"Subjective   Patient ID: Lindsay Rogers is a 19 y.o. female who presents for Acne (On and off since 12/23, started new meds around then and thinks may be related. She said she has been taking Effexor every other day acne seems to be getting better.).    HPI   Has noticed an increase in acne since starting effexor in December. She is trying to wean herself off of the effexor by taking it every other day. She endorses that her [place] were [place] when she started taking it and since then her anxiety has decreased significantly.   Started Differin Gel-has been using for a couple weeks and noticed slight improvement  Uses gentle cleansers morning and night, and gentle sunscreen/moisturizers.  Uses a satin pillowcase and switches them out every few days.    Review of Systems   Constitutional: Negative.    HENT: Negative.     Eyes: Negative.    Respiratory: Negative.     Cardiovascular: Negative.    Gastrointestinal: Negative.    Endocrine: Negative.    Genitourinary: Negative.    Musculoskeletal: Negative.    Skin:         acne       Objective   /71 (BP Location: Right arm, Patient Position: Sitting)   Pulse 88   Temp 36.1 °C (97 °F) (Temporal)   Resp 14   Ht 1.6 m (5' 3\")   Wt 68 kg (150 lb)   SpO2 99%   BMI 26.57 kg/m²     Physical Exam  Cardiovascular:      Rate and Rhythm: Normal rate and regular rhythm.      Pulses: Normal pulses.      Heart sounds: Normal heart sounds.   Pulmonary:      Effort: Pulmonary effort is normal.      Breath sounds: Normal breath sounds.   Skin:     General: Skin is warm and dry.      Capillary Refill: Capillary refill takes less than 2 seconds.      Findings: Acne present.      Comments: cheeks and chin   Neurological:      Mental Status: She is oriented to person, place, and time.   Psychiatric:         Mood and Affect: Mood normal.         Behavior: Behavior normal.         Thought Content: Thought content normal.         Judgment: Judgment normal. "         Assessment/Plan   Problem List Items Addressed This Visit    None  Visit Diagnoses         Codes    Acne vulgaris    -  Primary L70.0    Relevant Medications    tretinoin (Retin-A) 0.025 % gel

## 2024-06-26 NOTE — PATIENT INSTRUCTIONS
Thank you for coming to see me today.  If you have any questions or concerns following our visit, please contact the office.  Phone: [phone]    Follow up with Edel as scheduled

## 2024-07-17 ENCOUNTER — LAB REQUISITION (OUTPATIENT)
Dept: LAB | Facility: HOSPITAL | Age: 19
End: 2024-07-17
Payer: OTHER GOVERNMENT

## 2024-07-17 DIAGNOSIS — N39.0 URINARY TRACT INFECTION, SITE NOT SPECIFIED: ICD-10-CM

## 2024-07-17 PROCEDURE — 87186 SC STD MICRODIL/AGAR DIL: CPT

## 2024-07-17 PROCEDURE — 87086 URINE CULTURE/COLONY COUNT: CPT

## 2024-07-20 LAB — BACTERIA UR CULT: ABNORMAL

## 2024-09-06 ENCOUNTER — APPOINTMENT (OUTPATIENT)
Dept: PRIMARY CARE | Facility: CLINIC | Age: 19
End: 2024-09-06
Payer: OTHER GOVERNMENT

## 2024-09-06 ENCOUNTER — OFFICE VISIT (OUTPATIENT)
Dept: PRIMARY CARE | Facility: CLINIC | Age: 19
End: 2024-09-06
Payer: OTHER GOVERNMENT

## 2024-09-06 VITALS
BODY MASS INDEX: 26.4 KG/M2 | HEIGHT: 63 IN | TEMPERATURE: 96.8 F | RESPIRATION RATE: 19 BRPM | DIASTOLIC BLOOD PRESSURE: 80 MMHG | HEART RATE: 93 BPM | WEIGHT: 149 LBS | OXYGEN SATURATION: 98 % | SYSTOLIC BLOOD PRESSURE: 118 MMHG

## 2024-09-06 DIAGNOSIS — Z00.00 HEALTHCARE MAINTENANCE: ICD-10-CM

## 2024-09-06 DIAGNOSIS — L70.0 ACNE VULGARIS: Primary | ICD-10-CM

## 2024-09-06 DIAGNOSIS — F41.8 ANXIETY WITH DEPRESSION: ICD-10-CM

## 2024-09-06 PROCEDURE — 99213 OFFICE O/P EST LOW 20 MIN: CPT

## 2024-09-06 PROCEDURE — 99395 PREV VISIT EST AGE 18-39: CPT

## 2024-09-06 PROCEDURE — 1036F TOBACCO NON-USER: CPT

## 2024-09-06 PROCEDURE — 3008F BODY MASS INDEX DOCD: CPT

## 2024-09-06 RX ORDER — ESCITALOPRAM OXALATE 5 MG/1
5 TABLET ORAL DAILY
Qty: 30 TABLET | Refills: 5 | Status: SHIPPED | OUTPATIENT
Start: 2024-09-06

## 2024-09-06 SDOH — ECONOMIC STABILITY: FOOD INSECURITY: WITHIN THE PAST 12 MONTHS, YOU WORRIED THAT YOUR FOOD WOULD RUN OUT BEFORE YOU GOT MONEY TO BUY MORE.: NEVER TRUE

## 2024-09-06 SDOH — ECONOMIC STABILITY: FOOD INSECURITY: WITHIN THE PAST 12 MONTHS, THE FOOD YOU BOUGHT JUST DIDN'T LAST AND YOU DIDN'T HAVE MONEY TO GET MORE.: NEVER TRUE

## 2024-09-06 ASSESSMENT — ENCOUNTER SYMPTOMS
ENDOCRINE NEGATIVE: 1
GASTROINTESTINAL NEGATIVE: 1
HEMATOLOGIC/LYMPHATIC NEGATIVE: 1
MUSCULOSKELETAL NEGATIVE: 1
NEUROLOGICAL NEGATIVE: 1
EYES NEGATIVE: 1
CONSTITUTIONAL NEGATIVE: 1
CARDIOVASCULAR NEGATIVE: 1
PSYCHIATRIC NEGATIVE: 1
RESPIRATORY NEGATIVE: 1

## 2024-09-06 ASSESSMENT — PATIENT HEALTH QUESTIONNAIRE - PHQ9
1. LITTLE INTEREST OR PLEASURE IN DOING THINGS: NOT AT ALL
SUM OF ALL RESPONSES TO PHQ9 QUESTIONS 1 & 2: 0
2. FEELING DOWN, DEPRESSED OR HOPELESS: NOT AT ALL

## 2024-09-06 ASSESSMENT — PAIN SCALES - GENERAL: PAINLEVEL: 0-NO PAIN

## 2024-09-06 ASSESSMENT — LIFESTYLE VARIABLES
HOW OFTEN DO YOU HAVE SIX OR MORE DRINKS ON ONE OCCASION: NEVER
HOW MANY STANDARD DRINKS CONTAINING ALCOHOL DO YOU HAVE ON A TYPICAL DAY: PATIENT DOES NOT DRINK
SKIP TO QUESTIONS 9-10: 1
AUDIT-C TOTAL SCORE: 0
HOW OFTEN DO YOU HAVE A DRINK CONTAINING ALCOHOL: NEVER

## 2024-09-06 NOTE — PATIENT INSTRUCTIONS
Thank you for coming to see me today.  If you have any questions or concerns following our visit, please contact the office.  Phone: [phone]    Follow up with me in 1 year or sooner as needed    1)  Get fasting labwork in the next 1-2 weeks.  The lab is down the black from our office.     2) RESTART escitalopram 5mg daily for 3 weeks to help with transitioning off of venlafaxine    3) Let me know if you would like to try buspirone for acute anxiety    4) I am referring you to dermatology, please schedule appointment

## 2024-09-06 NOTE — PROGRESS NOTES
Subjective   Patient ID: Lindsay Rogers is a 19 y.o. female who presents for CPE.    Interested in trying to get off of venlafaxine, feels like escitalopram worked better, was having vomiting while toward sthe end of being on escitalopram. Has been taking venlafaxine every other day but having issues with worsening skin issues.     Diet: overall healthy balanced diet, eating out x2 per month. Occasional granola bar snacks. Coffee cold brew 4 times per week. Energy drink x3 times per week.   Exercise: running and lifting 1.5 hours 4 times per week.   Weight: stable   Water: Trying for 6-7 glasses per day  Sleep: Good sleep, getting about 6-7 hours per night, functions wells  Social: In a relationship with male partner, lives in an apartment. No children, no pets. Parents in [place]. 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     Review of Systems   Constitutional: Negative.    HENT: Negative.     Eyes: Negative.    Respiratory: Negative.     Cardiovascular: Negative.    Gastrointestinal: Negative.    Endocrine: Negative.    Genitourinary: Negative.    Musculoskeletal: Negative.    Skin: Negative.    Neurological: Negative.    Hematological: Negative.    Psychiatric/Behavioral: Negative.          Current Outpatient Medications   Medication Sig Dispense Refill    hydrOXYzine HCL (Atarax) 10 mg tablet Take 1 tablet (10 mg) by mouth 3 times a day as needed for anxiety. 90 tablet 3    L.acid,ferm,jose,rha-B.bif,long (Controlled Delivery Probiotic) 126 mg (2 billion cell) tablet,delayed and ext.release Take by mouth.      norethindrone-e.estradioL-iron (Blisovi Fe 1/20, 28,) 1 mg-20 mcg (21)/75 mg (7) tablet Take 1 tablet by mouth once daily. 28 tablet 12    tretinoin (Retin-A) 0.025 % gel Apply topically once daily at bedtime. apply to face at bedtime. Use moisturizer afterwards 15 g 2    escitalopram  "(Lexapro) 5 mg tablet Take 1 tablet (5 mg) by mouth once daily. 30 tablet 5     No current facility-administered medications for this visit.     Past Surgical History:   Procedure Laterality Date    OTHER SURGICAL HISTORY  01/28/2022    Hip labral tear repair     Family History   Problem Relation Name Age of Onset    Endometriosis Mother        Social History     Tobacco Use    Smoking status: Never    Smokeless tobacco: Never   Vaping Use    Vaping status: Never Used   Substance Use Topics    Alcohol use: Never    Drug use: Never        Objective     Visit Vitals  /80 (BP Location: Left arm, Patient Position: Sitting)   Pulse 93   Temp 36 °C (96.8 °F) (Temporal)   Resp 19   Ht 1.6 m (5' 3\")   Wt 67.6 kg (149 lb)   SpO2 98%   BMI 26.39 kg/m²   OB Status Having periods   Smoking Status Never   BSA 1.73 m²        Physical Exam  Constitutional:       Appearance: Normal appearance.   HENT:      Head: Normocephalic and atraumatic.   Eyes:      Extraocular Movements: Extraocular movements intact.      Pupils: Pupils are equal, round, and reactive to light.   Cardiovascular:      Rate and Rhythm: Normal rate and regular rhythm.   Pulmonary:      Effort: Pulmonary effort is normal.      Breath sounds: Normal breath sounds.   Abdominal:      General: Abdomen is flat. Bowel sounds are normal.      Palpations: Abdomen is soft.   Musculoskeletal:         General: Normal range of motion.   Skin:     General: Skin is warm and dry.      Capillary Refill: Capillary refill takes less than 2 seconds.   Neurological:      General: No focal deficit present.      Mental Status: She is alert and oriented to person, place, and time.   Psychiatric:         Mood and Affect: Mood normal.         Behavior: Behavior normal.           Assessment/Plan   Problem List Items Addressed This Visit       Anxiety with depression     Felt better with escitalopram vs. Venlafaxine    Transition from venlafaxine to escitalopram 5mg daily  Continue " PRN hydroxyzine for acute anxiety/panic  Discussed buspirone, patient wishing to continue with PRN hydroxyzine for now         Relevant Medications    escitalopram (Lexapro) 5 mg tablet    Healthcare maintenance     - Healthy diet, good quality and duration of sleep, healthy water intake, regular exercise and healthy weight discussed  Vaccines   Flu: Recommended   Tdap: Last in 8/2014, recommended and declined today  - GYN/PAP: following with GYN, scheduled for October  -Following with dentistry and optometry regularly  -No symptoms of depression/concerns for anxiety  -Feeling safe at home  -Skin cancer prevention           Other Visit Diagnoses       Acne vulgaris    -  Primary    Relevant Orders    Referral to Dermatology            All pertinent lab work and results were reviewed with patient.     Follow up with me or alternate PCP in 1 year     FENG Granda-CNS

## 2024-09-06 NOTE — ASSESSMENT & PLAN NOTE
Felt better with escitalopram vs. Venlafaxine    Transition from venlafaxine to escitalopram 5mg daily  Continue PRN hydroxyzine for acute anxiety/panic  Discussed buspirone, patient wishing to continue with PRN hydroxyzine for now

## 2024-09-06 NOTE — ASSESSMENT & PLAN NOTE
- Healthy diet, good quality and duration of sleep, healthy water intake, regular exercise and healthy weight discussed  Vaccines   Flu: Recommended   Tdap: Last in 8/2014, recommended and declined today  - GYN/PAP: following with GYN, scheduled for October  -Following with dentistry and optometry regularly  -No symptoms of depression/concerns for anxiety  -Feeling safe at home  -Skin cancer prevention

## 2024-09-11 ENCOUNTER — LAB (OUTPATIENT)
Dept: LAB | Facility: LAB | Age: 19
End: 2024-09-11
Payer: OTHER GOVERNMENT

## 2024-09-11 DIAGNOSIS — Z13.29 SCREENING FOR THYROID DISORDER: ICD-10-CM

## 2024-09-11 DIAGNOSIS — Z13.89 SCREENING FOR NEPHROPATHY: ICD-10-CM

## 2024-09-11 DIAGNOSIS — Z13.220 SCREENING FOR HYPERLIPIDEMIA: ICD-10-CM

## 2024-09-11 DIAGNOSIS — Z13.0 SCREENING FOR DEFICIENCY ANEMIA: ICD-10-CM

## 2024-09-11 LAB
25(OH)D3 SERPL-MCNC: 67 NG/ML (ref 30–100)
ALBUMIN SERPL BCP-MCNC: 4.3 G/DL (ref 3.4–5)
ALP SERPL-CCNC: 44 U/L (ref 33–110)
ALT SERPL W P-5'-P-CCNC: 18 U/L (ref 7–45)
ANION GAP SERPL CALC-SCNC: 12 MMOL/L (ref 10–20)
AST SERPL W P-5'-P-CCNC: 23 U/L (ref 9–39)
BILIRUB SERPL-MCNC: 0.4 MG/DL (ref 0–1.2)
BUN SERPL-MCNC: 14 MG/DL (ref 6–23)
CALCIUM SERPL-MCNC: 8.9 MG/DL (ref 8.6–10.3)
CHLORIDE SERPL-SCNC: 104 MMOL/L (ref 98–107)
CHOLEST SERPL-MCNC: 185 MG/DL (ref 0–199)
CHOLESTEROL/HDL RATIO: 3.1
CO2 SERPL-SCNC: 24 MMOL/L (ref 21–32)
CREAT SERPL-MCNC: 0.99 MG/DL (ref 0.5–1.05)
EGFRCR SERPLBLD CKD-EPI 2021: 83 ML/MIN/1.73M*2
ERYTHROCYTE [DISTWIDTH] IN BLOOD BY AUTOMATED COUNT: 12.1 % (ref 11.5–14.5)
GLUCOSE SERPL-MCNC: 82 MG/DL (ref 74–99)
HCT VFR BLD AUTO: 44.2 % (ref 36–46)
HDLC SERPL-MCNC: 59.6 MG/DL
HGB BLD-MCNC: 14.5 G/DL (ref 12–16)
LDLC SERPL CALC-MCNC: 105 MG/DL
MCH RBC QN AUTO: 28.2 PG (ref 26–34)
MCHC RBC AUTO-ENTMCNC: 32.8 G/DL (ref 32–36)
MCV RBC AUTO: 86 FL (ref 80–100)
NON HDL CHOLESTEROL: 125 MG/DL (ref 0–149)
NRBC BLD-RTO: 0 /100 WBCS (ref 0–0)
PLATELET # BLD AUTO: 309 X10*3/UL (ref 150–450)
POTASSIUM SERPL-SCNC: 4.3 MMOL/L (ref 3.5–5.3)
PROT SERPL-MCNC: 6.7 G/DL (ref 6.4–8.2)
RBC # BLD AUTO: 5.15 X10*6/UL (ref 4–5.2)
SODIUM SERPL-SCNC: 136 MMOL/L (ref 136–145)
TRIGL SERPL-MCNC: 104 MG/DL (ref 0–149)
TSH SERPL-ACNC: 3.86 MIU/L (ref 0.44–3.98)
VLDL: 21 MG/DL (ref 0–40)
WBC # BLD AUTO: 4.7 X10*3/UL (ref 4.4–11.3)

## 2024-09-11 PROCEDURE — 36415 COLL VENOUS BLD VENIPUNCTURE: CPT

## 2024-10-17 ENCOUNTER — APPOINTMENT (OUTPATIENT)
Dept: OBSTETRICS AND GYNECOLOGY | Facility: CLINIC | Age: 19
End: 2024-10-17
Payer: OTHER GOVERNMENT

## 2024-10-17 VITALS
WEIGHT: 146 LBS | HEIGHT: 63 IN | BODY MASS INDEX: 25.87 KG/M2 | SYSTOLIC BLOOD PRESSURE: 100 MMHG | DIASTOLIC BLOOD PRESSURE: 62 MMHG

## 2024-10-17 DIAGNOSIS — Z12.4 PAP SMEAR FOR CERVICAL CANCER SCREENING: ICD-10-CM

## 2024-10-17 DIAGNOSIS — Z01.419 WOMEN'S ANNUAL ROUTINE GYNECOLOGICAL EXAMINATION: Primary | ICD-10-CM

## 2024-10-17 DIAGNOSIS — N94.6 DYSMENORRHEA IN ADOLESCENT: ICD-10-CM

## 2024-10-17 PROCEDURE — 1036F TOBACCO NON-USER: CPT | Performed by: NURSE PRACTITIONER

## 2024-10-17 PROCEDURE — 3008F BODY MASS INDEX DOCD: CPT | Performed by: NURSE PRACTITIONER

## 2024-10-17 PROCEDURE — 99395 PREV VISIT EST AGE 18-39: CPT | Performed by: NURSE PRACTITIONER

## 2024-10-17 RX ORDER — NORETHINDRONE ACETATE AND ETHINYL ESTRADIOL 1MG-20(21)
1 KIT ORAL DAILY
Qty: 84 TABLET | Refills: 3 | Status: SHIPPED | OUTPATIENT
Start: 2024-10-17 | End: 2025-10-17

## 2024-10-17 ASSESSMENT — ENCOUNTER SYMPTOMS
NEUROLOGICAL NEGATIVE: 1
PSYCHIATRIC NEGATIVE: 1
RESPIRATORY NEGATIVE: 1
ENDOCRINE NEGATIVE: 1
EYES NEGATIVE: 1
GASTROINTESTINAL NEGATIVE: 1
CARDIOVASCULAR NEGATIVE: 1
MUSCULOSKELETAL NEGATIVE: 1
CONSTITUTIONAL NEGATIVE: 1

## 2024-10-17 NOTE — PROGRESS NOTES
Subjective   Patient ID: Lindsay Rogers is a 19 y.o. female who presents for New Patient Visit.  19 year old here for annual exam without complaints.  She is due for her pap today as she has never had a pap in the past.  She denies any need for std testing.  She is doing well with her ocp and desires to continue.         Review of Systems   Constitutional: Negative.    HENT: Negative.     Eyes: Negative.    Respiratory: Negative.     Cardiovascular: Negative.    Gastrointestinal: Negative.    Endocrine: Negative.    Genitourinary: Negative.    Musculoskeletal: Negative.    Skin: Negative.    Neurological: Negative.    Psychiatric/Behavioral: Negative.         Objective   Physical Exam  Vitals reviewed.   Constitutional:       Appearance: Normal appearance. She is well-developed.   Pulmonary:      Effort: Pulmonary effort is normal. No respiratory distress.   Chest:   Breasts:     Breasts are symmetrical.      Right: Normal. No swelling, bleeding, inverted nipple, mass, nipple discharge, skin change or tenderness.      Left: Normal. No swelling, bleeding, inverted nipple, mass, nipple discharge, skin change or tenderness.   Abdominal:      Palpations: Abdomen is soft.   Genitourinary:     General: Normal vulva.      Exam position: Lithotomy position.      Pubic Area: No rash.       Labia:         Right: No rash, tenderness, lesion or injury.         Left: No rash, tenderness, lesion or injury.       Urethra: No prolapse, urethral pain, urethral swelling or urethral lesion.      Vagina: Normal.      Cervix: Normal.      Uterus: Normal.       Adnexa: Right adnexa normal and left adnexa normal.      Rectum: Normal.      Comments: Pap sent  Musculoskeletal:         General: Normal range of motion.   Lymphadenopathy:      Upper Body:      Right upper body: No supraclavicular, axillary or pectoral adenopathy.      Left upper body: No supraclavicular, axillary or pectoral adenopathy.   Skin:     General: Skin is warm and  dry.   Neurological:      General: No focal deficit present.      Mental Status: She is alert and oriented to person, place, and time. Mental status is at baseline.   Psychiatric:         Attention and Perception: Attention and perception normal.         Mood and Affect: Mood and affect normal.         Speech: Speech normal.         Behavior: Behavior normal. Behavior is cooperative.         Thought Content: Thought content normal.         Judgment: Judgment normal.         Assessment/Plan   Problem List Items Addressed This Visit             ICD-10-CM    Dysmenorrhea in adolescent N94.6    Relevant Medications    norethindrone-e.estradioL-iron (Blisovi Fe 1/20, 28,) 1 mg-20 mcg (21)/75 mg (7) tablet    Women's annual routine gynecological examination - Primary Z01.419     Other Visit Diagnoses         Codes    Pap smear for cervical cancer screening     Z12.4    Relevant Orders    THINPREP PAP TEST        Pap sent  Declined std testing  Oral contraceptives were discussed.  The risks and benefits were presented to the patient including the risk for VTE's and an increased risk with smoking. Patient stated understanding and desires use of OCP.  Additional use of condoms encouraged to patient to prevent STI's.  Blisovi ordered with refills x 1 year  Follow up 1 year for annual exam, sooner as needed if problems arise          Erica Mcclain, FENG-CNP 10/17/24 9:25 AM

## 2024-10-23 ENCOUNTER — TELEPHONE (OUTPATIENT)
Dept: OBSTETRICS AND GYNECOLOGY | Facility: CLINIC | Age: 19
End: 2024-10-23
Payer: OTHER GOVERNMENT

## 2024-10-23 DIAGNOSIS — N89.8 VAGINAL DISCHARGE: Primary | ICD-10-CM

## 2024-10-23 LAB
CYTOLOGY CMNT CVX/VAG CYTO-IMP: NORMAL
LAB AP HPV GENOTYPE QUESTION: NO
LAB AP HPV HR: NORMAL
LABORATORY COMMENT REPORT: NORMAL
PATH REPORT.TOTAL CANCER: NORMAL

## 2024-10-23 RX ORDER — TERCONAZOLE 4 MG/G
1 CREAM VAGINAL NIGHTLY
Qty: 45 G | Refills: 0 | Status: SHIPPED | OUTPATIENT
Start: 2024-10-23 | End: 2024-10-30

## 2024-10-23 NOTE — TELEPHONE ENCOUNTER
----- Message from Erica Mcclain sent at 10/23/2024  3:41 PM EDT -----  Terconazole ordered due to her pap showing yeast

## 2024-11-08 ENCOUNTER — OFFICE VISIT (OUTPATIENT)
Dept: URGENT CARE | Age: 19
End: 2024-11-08
Payer: OTHER GOVERNMENT

## 2024-11-08 VITALS
TEMPERATURE: 98.5 F | DIASTOLIC BLOOD PRESSURE: 99 MMHG | RESPIRATION RATE: 20 BRPM | OXYGEN SATURATION: 98 % | HEART RATE: 94 BPM | SYSTOLIC BLOOD PRESSURE: 123 MMHG

## 2024-11-08 DIAGNOSIS — M79.642 LEFT HAND PAIN: Primary | ICD-10-CM

## 2024-11-08 NOTE — PROGRESS NOTES
Subjective   Patient ID: Lindsay Rogers is a 19 y.o. female. They present today with a chief complaint of thumb pain (Thumb injury today).    History of Present Illness  HPI a 19-year-old female arrives to clinic with chief complaint of left thumb pain.  The patient reports that she bent her left thumb backwards.  She noticed immediate pain and swelling.  She is here for further evaluation and health maintenance.    Past Medical History  Allergies as of 11/08/2024 - Reviewed 11/08/2024   Allergen Reaction Noted    Amoxicillin Anaphylaxis and Angioedema 09/05/2023    Other Hives 06/01/2018    Penicillins Hives 06/01/2018       (Not in a hospital admission)       History reviewed. No pertinent past medical history.    Past Surgical History:   Procedure Laterality Date    OTHER SURGICAL HISTORY  01/28/2022    Hip labral tear repair        reports that she has never smoked. She has never used smokeless tobacco. She reports that she does not drink alcohol and does not use drugs.    Review of Systems  Review of Systems  Joint pain and swelling  Objective    Vitals:    11/08/24 1503   BP: (!) 123/99   Pulse: 94   Resp: 20   Temp: 36.9 °C (98.5 °F)   SpO2: 98%     Patient's last menstrual period was 10/13/2024.    Physical Exam  Ecchymosis to the left  Procedures    Point of Care Test & Imaging Results from this visit  No results found for this visit on 11/08/24.   No results found.    Diagnostic study results (if any) were reviewed by FELICE Segura.    Assessment/Plan   Allergies, medications, history, and pertinent labs/EKGs/Imaging reviewed by FELICE Segura.     Medical Decision Making  Upon initial assessment, the patient is sitting calmly in bedside chair no acute distress.  There is some ecchymosis and swelling to the left thumb.  Educated the patient that I am unable to rule out any osseous abnormalities without x-rays.  Given time constraints and lack of radiology technologist here at the  office, I did order an x-ray of her left hand.  She reports that she will head to [facility] urgent care tomorrow November 9, 2024 to get x-rays at that time.  I educated the patient to call [facility] urgent care after the x-ray so that the new provider can review them accordingly.  At the meantime, thumb spica splint was dispensed.  Tylenol, Motrin, ice for pain and inflammation.  The plan of care will be altered depending on the x-ray results that are completed tomorrow.  The patient agrees to plan of care was discharged stable condition.    As a result of the work-up, the patient was discharged home.  she was informed of her diagnosis and instructed to come back with any concerns or worsening of condition.  she and was agreeable to the plan as discussed above.  she was given the opportunity to ask questions.  All of the patient's questions were answered.    This document was generated using the assistance of voice recognition software. If there are any errors of spelling, grammar, syntax, or meaning; please feel free to contact me directly for clarification.     Orders and Diagnoses  Diagnoses and all orders for this visit:  Left hand pain  -     XR hand left 3+ views; Future      Medical Admin Record      Patient disposition: Home    Electronically signed by FELICE Segura  3:10 PM

## 2024-11-09 ENCOUNTER — ANCILLARY PROCEDURE (OUTPATIENT)
Dept: URGENT CARE | Age: 19
End: 2024-11-09
Payer: OTHER GOVERNMENT

## 2024-11-09 DIAGNOSIS — M79.642 LEFT HAND PAIN: ICD-10-CM

## 2024-11-09 PROCEDURE — 73130 X-RAY EXAM OF HAND: CPT | Mod: LEFT SIDE

## 2024-11-26 ENCOUNTER — OFFICE VISIT (OUTPATIENT)
Dept: URGENT CARE | Age: 19
End: 2024-11-26
Payer: OTHER GOVERNMENT

## 2024-11-26 VITALS
HEART RATE: 73 BPM | OXYGEN SATURATION: 98 % | DIASTOLIC BLOOD PRESSURE: 61 MMHG | SYSTOLIC BLOOD PRESSURE: 106 MMHG | RESPIRATION RATE: 16 BRPM | TEMPERATURE: 97.3 F

## 2024-11-26 DIAGNOSIS — H69.93 EUSTACHIAN TUBE DYSFUNCTION, BILATERAL: Primary | ICD-10-CM

## 2024-11-26 PROCEDURE — 99213 OFFICE O/P EST LOW 20 MIN: CPT | Performed by: PHYSICIAN ASSISTANT

## 2024-11-26 ASSESSMENT — ENCOUNTER SYMPTOMS
RHINORRHEA: 0
SINUS PRESSURE: 0
SINUS PAIN: 0
FEVER: 0
CHILLS: 0
COUGH: 0
SORE THROAT: 0

## 2024-11-26 NOTE — PATIENT INSTRUCTIONS
As discussed there is no ear infection. Your symptoms appear consistent with eustachian tube dysfunction.   To help the eustachian tubes open and drain, I recommend use of:  -- Antihistamines like Claritin or Zyrtec  -- Flonase to reduce nasal congestion  -- Decongestands like sudafed to dry out fluid    Follow up with your PCP if:  -- Symptoms not improved after 5-7 days  -- If you have any worsening or new symptoms

## 2024-11-26 NOTE — LETTER
November 26, 2024     Patient: Lindsay Rogers   YOB: 2005   Date of Visit: 11/26/2024       To Whom It May Concern:    Lindsay Rogers was seen in my clinic on 11/26/2024 at 2:15 pm. Please excuse Lindsay for her absence from school on this day.    If you have any questions or concerns, please don't hesitate to call.         Sincerely,         Alyson Johnson PA-C        CC: No Recipients

## 2024-11-26 NOTE — PROGRESS NOTES
Subjective   Patient ID: Lindsay Rogers is a 19 y.o. female. They present today with a chief complaint of Earache (Bilateral ear pain x 3 days).    History of Present Illness  Patient presents for bilateral ear pressure and discomfort. She states that this started about 3-4 days ago. Notes she developed sneezing that led to congestion starting last week. She states last year she had a sinus and ear infection, though this year she doesn't feel sick. She denies fevers, chills, ear discharge, sore throat, cough.  She states she took Sudafed once which did help temporarily.          Past Medical History  Allergies as of 11/26/2024 - Reviewed 11/26/2024   Allergen Reaction Noted    Amoxicillin Anaphylaxis and Angioedema 09/05/2023    Other Hives 06/01/2018    Penicillins Hives 06/01/2018       (Not in a hospital admission)       No past medical history on file.    Past Surgical History:   Procedure Laterality Date    OTHER SURGICAL HISTORY  01/28/2022    Hip labral tear repair        reports that she has never smoked. She has never used smokeless tobacco. She reports that she does not drink alcohol and does not use drugs.    Review of Systems  Review of Systems   Constitutional:  Negative for chills and fever.   HENT:  Positive for congestion, ear pain and sneezing. Negative for ear discharge, rhinorrhea, sinus pressure, sinus pain and sore throat.    Respiratory:  Negative for cough.                                   Objective    Vitals:    11/26/24 1421   BP: 106/61   Pulse: 73   Resp: 16   Temp: 36.3 °C (97.3 °F)   SpO2: 98%     No LMP recorded.    Physical Exam  Vitals and nursing note reviewed.   Constitutional:       General: She is not in acute distress.     Appearance: She is not ill-appearing.   HENT:      Head: Normocephalic.      Right Ear: Ear canal normal. A middle ear effusion is present. Tympanic membrane is not erythematous.      Left Ear: Ear canal normal. A middle ear effusion is present. Tympanic  membrane is not erythematous.      Nose: Congestion present. No rhinorrhea.      Mouth/Throat:      Mouth: Mucous membranes are moist.      Pharynx: No oropharyngeal exudate or posterior oropharyngeal erythema.   Cardiovascular:      Rate and Rhythm: Normal rate and regular rhythm.      Heart sounds: Normal heart sounds.   Pulmonary:      Effort: Pulmonary effort is normal. No respiratory distress.      Breath sounds: Normal breath sounds. No wheezing, rhonchi or rales.   Lymphadenopathy:      Cervical: No cervical adenopathy.   Neurological:      Mental Status: She is alert.         Procedures    Point of Care Test & Imaging Results from this visit  No results found for this visit on 11/26/24.   No results found.    Diagnostic study results (if any) were reviewed by Alyson Johnson PA-C.    Assessment/Plan   Allergies, medications, history, and pertinent labs/EKGs/Imaging reviewed by Alyson Johnson PA-C.     Medical Decision Making  MDM- Signs and symptoms consistent with Eustachian tube dysfunction likely secondary to environmental allergies or barometric pressure changes. No evidence of sinusitis, strep pharyngitis, otitis media, or other acute infective process. Advise OTC Flonase, decongestants, and supportive measures. Follow up if symptoms change, worsen, or do not improve. Patient verbalized understanding and agrees with plan.       Orders and Diagnoses  Diagnoses and all orders for this visit:  Eustachian tube dysfunction, bilateral      Medical Admin Record      Patient disposition: Home    Electronically signed by Alyson Johnson PA-C  2:33 PM

## 2025-03-04 ENCOUNTER — CLINICAL SUPPORT (OUTPATIENT)
Dept: PRIMARY CARE | Facility: CLINIC | Age: 20
End: 2025-03-04
Payer: OTHER GOVERNMENT

## 2025-03-04 DIAGNOSIS — J02.9 SORE THROAT: Primary | ICD-10-CM

## 2025-03-04 LAB — POC RAPID STREP: NEGATIVE

## 2025-03-04 PROCEDURE — 87880 STREP A ASSAY W/OPTIC: CPT

## 2025-03-04 NOTE — PROGRESS NOTES
Patient presents with sore throat, L ear pain X 2 days. Rapid strep test was performed and result was negative. Patient was advised to take OTC cough and sore throat treatments and to let us know if she was not feeling better in a week, per Edel.

## 2025-04-29 DIAGNOSIS — F41.9 ANXIETY: ICD-10-CM

## 2025-05-02 RX ORDER — HYDROXYZINE HYDROCHLORIDE 10 MG/1
TABLET, FILM COATED ORAL
Qty: 90 TABLET | Refills: 1 | Status: SHIPPED | OUTPATIENT
Start: 2025-05-02

## 2025-06-05 DIAGNOSIS — F41.8 ANXIETY WITH DEPRESSION: ICD-10-CM

## 2025-06-06 RX ORDER — ESCITALOPRAM OXALATE 5 MG/1
5 TABLET ORAL DAILY
Qty: 90 TABLET | Refills: 1 | Status: SHIPPED | OUTPATIENT
Start: 2025-06-06

## 2025-07-09 DIAGNOSIS — L70.0 ACNE VULGARIS: ICD-10-CM

## 2025-07-14 RX ORDER — TRETINOIN 0.25 MG/G
GEL TOPICAL
Qty: 15 G | Refills: 2 | Status: SHIPPED | OUTPATIENT
Start: 2025-07-14

## 2025-09-19 ENCOUNTER — APPOINTMENT (OUTPATIENT)
Dept: PRIMARY CARE | Facility: CLINIC | Age: 20
End: 2025-09-19
Payer: OTHER GOVERNMENT

## 2025-10-23 ENCOUNTER — APPOINTMENT (OUTPATIENT)
Dept: OBSTETRICS AND GYNECOLOGY | Facility: CLINIC | Age: 20
End: 2025-10-23
Payer: OTHER GOVERNMENT

## 2025-10-30 ENCOUNTER — APPOINTMENT (OUTPATIENT)
Dept: OBSTETRICS AND GYNECOLOGY | Facility: CLINIC | Age: 20
End: 2025-10-30
Payer: OTHER GOVERNMENT